# Patient Record
Sex: FEMALE | Race: WHITE | NOT HISPANIC OR LATINO | Employment: STUDENT | URBAN - METROPOLITAN AREA
[De-identification: names, ages, dates, MRNs, and addresses within clinical notes are randomized per-mention and may not be internally consistent; named-entity substitution may affect disease eponyms.]

---

## 2017-09-01 ENCOUNTER — PATIENT OUTREACH (OUTPATIENT)
Dept: ADMINISTRATIVE | Facility: HOSPITAL | Age: 22
End: 2017-09-01

## 2017-09-01 NOTE — LETTER
September 1, 2017    Alda Pérez  [address] [facility] [address]           [facility]  [address]  [place] [address]  Phone: [phone] Dear Alda Pérez    In the spirit of maintaining your good health, our system indicates that you have not been seen in the office in over 12 months and due for a visit.     Our system indicates that you are due for the following:   Health Maintenance Due   Topic Date Due    Lipid Panel  1994    HPV Vaccines (1 of 3 - Female 3 Dose Series) 11/13/2005    CHLAMYDIA SCREENING  11/13/2009    TETANUS VACCINE  11/13/2012    Pap Smear  11/13/2015    Influenza Vaccine  08/01/2017     Alexander Jeffries MD would like you to schedule an appointment for an annual exam at your earliest convenience. If you have completed any of these health maintenance requirements at an outside facility, please contact our office so we may update your health record.    If your PRIMARY CARE PHYSICIAN has changed please contact your insurance company to reflect the correct physician.    If you have any issues or need assistance in scheduling this appointment, please call the number below.     SAURABH Turner  Care Coordination Department  [facility]  [phone]

## 2017-09-01 NOTE — PROGRESS NOTES
Attempted to contact pt to schedule a physical. Pt has not been seen in our office over 12 months. Person stated wrong #.  Letter sent.

## 2023-01-20 ENCOUNTER — OFFICE VISIT (OUTPATIENT)
Dept: PEDIATRIC CARDIOLOGY | Facility: CLINIC | Age: 28
End: 2023-01-20
Payer: MEDICAID

## 2023-01-20 VITALS
SYSTOLIC BLOOD PRESSURE: 129 MMHG | HEART RATE: 85 BPM | DIASTOLIC BLOOD PRESSURE: 72 MMHG | HEIGHT: 62 IN | WEIGHT: 160.25 LBS | OXYGEN SATURATION: 100 % | BODY MASS INDEX: 29.49 KG/M2 | RESPIRATION RATE: 16 BRPM

## 2023-01-20 DIAGNOSIS — O35.9XX0 FETAL ABNORMALITY IN ANTEPARTUM PREGNANCY, SINGLE OR UNSPECIFIED FETUS: Primary | ICD-10-CM

## 2023-01-20 PROCEDURE — 3078F DIAST BP <80 MM HG: CPT | Mod: CPTII,S$GLB,, | Performed by: PEDIATRICS

## 2023-01-20 PROCEDURE — 99203 OFFICE O/P NEW LOW 30 MIN: CPT | Mod: 25,S$GLB,, | Performed by: PEDIATRICS

## 2023-01-20 PROCEDURE — 3074F SYST BP LT 130 MM HG: CPT | Mod: CPTII,S$GLB,, | Performed by: PEDIATRICS

## 2023-01-20 PROCEDURE — 3008F BODY MASS INDEX DOCD: CPT | Mod: CPTII,S$GLB,, | Performed by: PEDIATRICS

## 2023-01-20 PROCEDURE — 3008F PR BODY MASS INDEX (BMI) DOCUMENTED: ICD-10-PCS | Mod: CPTII,S$GLB,, | Performed by: PEDIATRICS

## 2023-01-20 PROCEDURE — 3074F PR MOST RECENT SYSTOLIC BLOOD PRESSURE < 130 MM HG: ICD-10-PCS | Mod: CPTII,S$GLB,, | Performed by: PEDIATRICS

## 2023-01-20 PROCEDURE — 3078F PR MOST RECENT DIASTOLIC BLOOD PRESSURE < 80 MM HG: ICD-10-PCS | Mod: CPTII,S$GLB,, | Performed by: PEDIATRICS

## 2023-01-20 PROCEDURE — 99203 PR OFFICE/OUTPT VISIT, NEW, LEVL III, 30-44 MIN: ICD-10-PCS | Mod: 25,S$GLB,, | Performed by: PEDIATRICS

## 2023-01-20 NOTE — PROGRESS NOTES
"      Thank you for referring your patient Alda Pérez to the Pediatric Cardiology clinic for consultation. Please review my findings below and feel free to contact for me for any questions or concerns.    Alda Pérez is a 27 y.o. female seen in clinic today for dilation of blood vessels    ASSESSMENT/PLAN:  {There are no diagnoses linked to this encounter. (Refresh or delete this SmartLink)}    Preventive Medicine:  SBE prophylaxis - {SBE Prophylaxis:65313::"None indicated"}  Exercise - {Exercise Restrictions:16767::"No activity restrictions"}    Follow Up:  No follow-ups on file.    SUBJECTIVE:  HPI  Alda Pérez is a 27 y.o. who was previously evaluated for chest pain on 1/19/2011 at which time she was discharged from follow up. She returns today for dilation of blood vessels. There are no complaints of chest pain, shortness of breath, palpitations, decreased activity, exercise intolerance, tachycardia, dizziness, syncope, documented arrhythmias, or headaches.    Review of patient's allergies indicates:   Allergen Reactions    Amoxicillin Anaphylaxis    Pcn [penicillins] Hives       Current Outpatient Medications:     EScitalopram oxalate (LEXAPRO) 20 MG tablet, Take 1 tablet (20 mg total) by mouth once daily., Disp: 30 tablet, Rfl: 11    ferrous sulfate 325 (65 FE) MG EC tablet, Take 1 tablet (325 mg total) by mouth once daily., Disp: 30 tablet, Rfl: 6    famotidine (PEPCID) 20 MG tablet, Take 1 tablet (20 mg total) by mouth 2 (two) times daily. (Patient not taking: Reported on 1/20/2023), Disp: 60 tablet, Rfl: 11    metFORMIN (GLUCOPHAGE-XR) 500 MG ER 24hr tablet, Take 1 tablet (500 mg total) by mouth daily with breakfast., Disp: 90 tablet, Rfl: 3    ondansetron (ZOFRAN) 4 MG tablet, Take 1 tablet (4 mg total) by mouth every 6 (six) hours as needed for Nausea. (Patient not taking: Reported on 1/20/2023), Disp: 60 tablet, Rfl: 2    pantoprazole (PROTONIX) 40 MG tablet, Take 1 tablet (40 mg total) " by mouth once daily. (Patient not taking: Reported on 2023), Disp: 30 tablet, Rfl: 6    progesterone (PROMETRIUM) 200 MG capsule, Place 1 capsule (200 mg total) vaginally every evening., Disp: 30 capsule, Rfl: 2    Current Facility-Administered Medications:     HYDROXYprogesterone caproate ([place]) injection 250 mg, 250 mg, Intramuscular, Q7 Days, Felicita Mccall MD, 250 mg at 23 1031  Past Medical History:   Diagnosis Date    Anemia     11/15 h/h . Fe daily. [   ]rpt cbc at 35wk    Anxiety / depression     lexapro 10mg po daily-->inc to 20mg daily    Encounter for supervision of normal pregnancy in first trimester     Call [facility] for delivery. MEI by LMP c/w 7w sono.    h/o abnormal pap     pap at PCP office 3/19 ASCUS/-HPV per pt;  nL;  nl    h/o hypothyroid      TSH nl    h/o SAB     History of  delivery     PPROM at 29w. weekly 17OHP.    History of seizure     prev rx'd keppra-last seizure , no current meds-dx in ER    Insulin resistance     early 1hr nl at 14w    IUGR (intrauterine growth restriction) affecting care of mother     MJ use this preg      +THC. - +THC. 11/15- +THC [   ] rpt UDS    PCOS (polycystic ovarian syndrome)     dx  prior to conception    prior c/s X 1     LST c/s at 29w 2/2 compound presentation    Unwanted fertility     BTL papers signed 22      Past Surgical History:   Procedure Laterality Date     SECTION  2019    PPROM at 29w, primary LST c/s at 29w 2/2 compound presentation and NRFA     Family History   Problem Relation Age of Onset    Stroke Mother     Diabetes Paternal Grandmother     Hypertension Paternal Grandmother     Heart attack Paternal Grandmother       There is no direct family history of congenital heart disease, sudden death, arrythmia, hypercholesterolemia, myocardial infarction, diabetes, or cancer .  Social History     Socioeconomic History    Marital status:    Tobacco Use    Smoking  status: Former     Packs/day: 1.00     Years: 4.00     Pack years: 4.00     Types: Cigarettes    Smokeless tobacco: Never   Substance and Sexual Activity    Alcohol use: Not Currently     Comment: occasional beer, liquor.    Drug use: Yes     Types: Marijuana    Sexual activity: Yes     Partners: Female     Birth control/protection: None   Social History Narrative    ~200mg of caffeine intake daily    Mom smokes    Lives with mother, , and two children (step-daughter and son)    Does not exercise regularly        Interval Hospitalizations/Procedures:  {NONE DEFAULTED:09154}    Review of Systems   A comprehensive review of symptoms was completed and negative except as noted above.    OBJECTIVE:  Vital signs  There were no vitals filed for this visit.   There is no height or weight on file to calculate BMI.    Physical Exam     Electrocardiogram:  {EKG results:71966}    Echocardiogram:  {Echo results:67135}    {Optional documentation below for documenting time spent for a visit to justify LOS. (This text will automatically delete.) :29608}{Time Based Documentation (Optional):82814}    Noe Lennon MD  [facility]  [facility]  [address] [facility] [address]  Dept: [phone]  Dept Fax: [phone]

## 2023-01-20 NOTE — PROGRESS NOTES
[facility]      OB:  Dr. SUBHASH Mccall    MFM:  Dr. ORIN Lewis      Today, I had the pleasure of evaluating Alda Pérez who is now 27 y.o. and carrying her fourth pregnancy at 35 weeks gestation. She was referred for evaluation of the fetal heart due possible aortic arch anomaly.      She is carrying a female fetus named Ibrahima.      Obstetric History:    .  Her OB history is otherwise unremarkable.     Past Medical History:   Diagnosis Date    Anemia     11/15 h/h . Fe daily. [   ]rpt cbc at 35wk    Anxiety / depression     lexapro 10mg po daily-->inc to 20mg daily    Encounter for supervision of normal pregnancy in first trimester     Call [facility] for delivery. MEI by LMP c/w 7w sono.    h/o abnormal pap     pap at PCP office 3/19 ASCUS/-HPV per pt;  nL;  nl    h/o hypothyroid      TSH nl    h/o SAB     History of  delivery     PPROM at 29w. weekly 17OHP.    History of seizure     prev rx'd keppra-last seizure , no current meds-dx in ER    Insulin resistance     early 1hr nl at 14w    IUGR (intrauterine growth restriction) affecting care of mother     MJ use this preg      +THC. - +THC. 11/15- +THC  -+THC    PCOS (polycystic ovarian syndrome)     dx  prior to conception    prior c/s X 1     LST c/s at 29w 2/2 compound presentation    Unwanted fertility     BTL papers signed 22         Current Outpatient Medications:     EScitalopram oxalate (LEXAPRO) 20 MG tablet, Take 1 tablet (20 mg total) by mouth once daily., Disp: 30 tablet, Rfl: 11    ferrous sulfate 325 (65 FE) MG EC tablet, Take 1 tablet (325 mg total) by mouth once daily., Disp: 30 tablet, Rfl: 6    ondansetron (ZOFRAN) 4 MG tablet, Take 1 tablet (4 mg total) by mouth every 6 (six) hours as needed for Nausea., Disp: 60 tablet, Rfl: 2    Current Facility-Administered Medications:     HYDROXYprogesterone caproate ([place]) injection  250 mg, 250 mg, Intramuscular, Q7 Days, Felicita Mccall MD, 250 mg at 23 1031    Family History: Negative for congenital heart disease, early coronary artery disease, sudden unexplained death, connective tissues disorders, genetic syndromes, multiple miscarriages or other congenital anomalies.      FETAL ECHOCARDIOGRAM (summary):    Grossly structurally normal fetal heart. There is a normal fetal foramen ovale with right to left flow. There is no significant atrioventricular valve insufficiency. Good cardiac contractility. The ductus arteriosus was visualized with right to left flow. The aortic arch appeared normal in size without obstruction. No pericardial effusion.    Impression:  Single active female fetus at 35 wga.  Normal fetal echocardiogram.      Todays fetal echocardiogram is normal, within the limitations of fetal echocardiography.  I discussed with her that fetal echocardiography is insufficiently sensitive to rule out all septal defects, anomalies of pulmonary and systemic veins, arch anomalies, and some valvar abnormalities, nor can it ensure that the ductus arteriosus and foramen ovale will spontaneously close.     Recommendations:  Location, timing, and mode of delivery will be determined by the obstetrical team.  She does not require further follow-up in the fetal echocardiography clinic, but I would be happy to see her again if additional questions or concerns arise.    Should there be any concerns about the baby's heart after birth, a post-adriana echocardiogram and cardiology consultation are recommended.     The above information was discussed in detail including the use of diagrams, with 35 minutes of total face to face time, with greater than 50% with counseling and coordination of care.  The discussion of the diagnosis and treatment options is as described above.        Braydon Lennon MD  Pediatric Cardiology  [address]  [place] [address]  Office: [phone]  Cell:  [phone]